# Patient Record
Sex: FEMALE
[De-identification: names, ages, dates, MRNs, and addresses within clinical notes are randomized per-mention and may not be internally consistent; named-entity substitution may affect disease eponyms.]

---

## 2017-03-15 NOTE — PATIENT DISCUSSION
**MILD TO MODERATE DRY EYE, OU: PRESCRIBED SYSTANE ARTIFICIAL TEARS 2-3 X A DAY OU. RECOMMENDS OMEGA-3 FISH OIL WITH PRIMARY CARE PHYSICIANS APPROVAL. RETURN FOR FOLLOW-UP AS SCHEDULED OR SOONER IF SYMPTOMS WORSEN.

## 2017-03-15 NOTE — PATIENT DISCUSSION
ALLERGIC CONJUNCTIVITIS OU: CONTINUE PAZEO TO USE QAM PRN FOR ITCHING AND IRRITATION. CALL IF NO IMPROVEMENT IN SYMPTOMS.

## 2017-03-15 NOTE — PATIENT DISCUSSION
Continue: PreserVision AREDS 2 (vit c,c-kh-mfvmu-lutein-zeaxan): capsule: 276-830-60-8 mg-unit-mg-mg 1 capsule once a day with meals by mouth

## 2017-03-15 NOTE — PATIENT DISCUSSION
Subconjunctival Hemorrhage Counseling:  I have explained that this is a temporary condition that will resolve spontaneously within a week or two. I have reassured the patient that this is a common occurrence often associated with dry eye, straining, systemic blood thinners or fluctuating blood pressure. I have explained to the patient that if these become recurrent or excessive, they should return to the office for a medical workup, assessment of risk factors, and possible appropriate laboratory studies, sometimes in collaboration with the patient's primary care physician.

## 2017-03-15 NOTE — PATIENT DISCUSSION
AMD (DRY), OU: VERY EARLY STAGE. PRESCRIBE AREDS 2 VITAMINS / AMSLER GRID DAILY / UV PROTECTION. SMOKING CESSATION EMPHASIZED. RETURN FOR FOLLOW-UP AS SCHEDULED.

## 2017-09-20 NOTE — PATIENT DISCUSSION
RETINA IS ATTACHED OU: PVD OU; NO HOLES OR TEARS SEEN ON DILATED EXAM TODAY.  RETINAL DETACHMENT SIGNS AND SYMPTOMS REVIEWED

## 2017-09-20 NOTE — PATIENT DISCUSSION
Continue: PreserVision AREDS 2 (vit c,o-me-hjxeb-lutein-zeaxan): capsule: 745-814-03-4 mg-unit-mg-mg 1 capsule once a day with meals by mouth

## 2018-07-18 NOTE — PATIENT DISCUSSION
Continue: PreserVision AREDS 2 (vit c,n-an-rmedj-lutein-zeaxan): capsule: 615-225-72-6 mg-unit-mg-mg 1 capsule once a day with meals by mouth

## 2018-07-18 NOTE — PATIENT DISCUSSION
MILD DRY EYE, OU: PRESCRIBED SYSTANE ARTIFICIAL TEARS PRN OU. RECOMMENDS OMEGA-3 FISH OIL WITH PRIMARY CARE PHYSICIANS APPROVAL. RETURN FOR FOLLOW-UP AS SCHEDULED OR SOONER IF SYMPTOMS WORSEN.

## 2018-09-19 NOTE — PATIENT DISCUSSION
Continue: PreserVision AREDS 2 (vit c,i-fx-spcid-lutein-zeaxan): capsule: 606-270-69-5 mg-unit-mg-mg 1 capsule once a day with meals by mouth

## 2018-12-19 ENCOUNTER — IMPORTED ENCOUNTER (OUTPATIENT)
Age: 63
End: 2018-12-19

## 2019-02-15 ENCOUNTER — IMPORTED ENCOUNTER (OUTPATIENT)
Age: 64
End: 2019-02-15

## 2019-07-11 NOTE — PATIENT DISCUSSION
AMD (DRY), OU:  PRESCRIBE AREDS 2 VITAMINS AND RECOMMEND UV PROTECTION. PATIENT IS AWARE OF AMSLER GRID AND HOW TO CHECK FOR PROGRESSION. SMOKING AVOIDANCE ADVISED.

## 2019-07-11 NOTE — PATIENT DISCUSSION
**MILD TO MODERATE DRY EYE, OU: PRESCRIBED REFRESH ADVANCED ARTIFICIAL TEARS 2-3 X A DAY OU. USE PAZEO AS NEEDED FOR ITCHING. RECOMMENDS OMEGA-3 FISH OIL WITH PRIMARY CARE PHYSICIANS APPROVAL. RETURN FOR FOLLOW-UP AS SCHEDULED OR SOONER IF SYMPTOMS WORSEN.

## 2019-07-11 NOTE — PATIENT DISCUSSION
Continue: PreserVision AREDS 2 (vit c,n-rj-bxrle-lutein-zeaxan): capsule: 213-813-15-5 mg-unit-mg-mg 1 capsule once a day with meals by mouth

## 2019-10-30 NOTE — PATIENT DISCUSSION
Continue: PreserVision AREDS 2 (vit c,a-fv-zttfr-lutein-zeaxan): capsule: 654-867-94-6 mg-unit-mg-mg 1 capsule once a day with meals by mouth

## 2020-07-13 NOTE — PATIENT DISCUSSION
Continue: Systane (peg 400-propylene glycol): drops: 0.4-0.3% 1 drop at bedtime as needed into both eyes

## 2020-07-13 NOTE — PATIENT DISCUSSION
Continue: PreserVision AREDS 2 (vit c,p-cw-euaqc-lutein-zeaxan): capsule: 976-839-58-1 mg-unit-mg-mg 1 capsule twice a day with meals by mouth

## 2021-02-10 NOTE — PATIENT DISCUSSION
Continue: PreserVision AREDS 2 (vit c,u-xf-ksiir-lutein-zeaxan): capsule: 958-881-78-2 mg-unit-mg-mg 1 capsule twice a day with meals by mouth

## 2021-06-15 NOTE — PATIENT DISCUSSION
Continue: PreserVision AREDS 2 (vit c,l-ol-czdyr-lutein-zeaxan): capsule: 475-613-15-7 mg-unit-mg-mg 1 capsule twice a day with meals by mouth

## 2021-06-15 NOTE — PATIENT DISCUSSION
DRY EYE SYNDROME OU: EDUCATED PATIENT ON FINDINGS AND ROLE IN FLUCTUATING VISION. CONTINUE ARTIFICIAL TEARS QID/PRN OU AND BEGIN WARM COMPRESSES QD-BID OU. DISCUSSED GOOD VISUAL HYGIENE AND CONSCIOUS BLINKING. ADVISED TO RTC IF SI/SX PERSIST OR WORSEN. MONITOR.

## 2021-06-15 NOTE — PATIENT DISCUSSION
SUBJECTIVE VISUAL DISTURBANCE/DRY ARMD OU: EDUCATED PATIENT ON FINDINGS. DISCUSSED THAT DISTORTION SEEN ON AMSLER GRID REFLECTS INTO OTHER NEAR ACTIVITIES. ARMD STABLE ON OCT AND UNDILATED EXAMINATION WITHOUT IRF/SRF OU. CONTINUE HOME AMSLER GRID MONITORING AND ADVISED TO RTC IF SI/SX PERSIST OR WORSEN. MONITOR.

## 2021-07-15 NOTE — PATIENT DISCUSSION
ARMD, OU: PT REPORTS METAMORPHOPSIA IN THE LAST SEVERAL WEEKS OS. SMALL INTRARETINAL HEME OS. RETURN TO DR. Meghana Wheeler IN THE NEXT 7-10 DAYS TO RULE OUT NEW ONSET EXUDATIVE AMD OS. REASSURED PATIENT THAT THERE IS NO REPORTED OCULAR SIDE EFFECTS RELATED TO THE COVID-19 PFISER VACCINE.

## 2021-07-15 NOTE — PATIENT DISCUSSION
Continue: PreserVision AREDS 2 (vit c,m-ap-fibrx-lutein-zeaxan): capsule: 408-986-85-1 mg-unit-mg-mg 1 capsule twice a day with meals by mouth

## 2021-07-15 NOTE — PATIENT DISCUSSION
Continue: Pataday Once Daily Relief (olopatadine): drops: 0.7% 1 drop every morning as needed into both eyes 07-

## 2021-07-21 NOTE — PATIENT DISCUSSION
Continue: PreserVision AREDS 2 (vit c,c-as-ljzcl-lutein-zeaxan): capsule: 955-380-47-5 mg-unit-mg-mg 1 capsule twice a day with meals by mouth

## 2021-08-18 NOTE — PATIENT DISCUSSION
Continue: PreserVision AREDS 2 (vit c,c-cl-dtjmc-lutein-zeaxan): capsule: 782-492-54-9 mg-unit-mg-mg 1 capsule twice a day with meals by mouth

## 2021-08-31 NOTE — PATIENT DISCUSSION
Continue: PreserVision AREDS 2 (vit c,l-cz-zlhvq-lutein-zeaxan): capsule: 317-321-57-2 mg-unit-mg-mg 1 capsule twice a day with meals by mouth

## 2021-09-15 NOTE — PATIENT DISCUSSION
Continue: PreserVision AREDS 2 (vit c,y-uq-yggku-lutein-zeaxan): capsule: 371-099-08-7 mg-unit-mg-mg 1 capsule twice a day with meals by mouth

## 2021-10-20 NOTE — PROCEDURE NOTE: CLINICAL
PROCEDURE NOTE: Avastin () OS. Diagnosis: Neovascular AMD with Active CNV. Anesthesia: Topical/Subconjunctival. Prep: Betadine Drops and Betadine Scrub. Betadine prep was performed. Product is within expiration date, and has been verified. An unopened 30 g needle was securely affixed to the 1 cc syringe. Excess drug and air bubbles were carefully expressed such that the plunger aligned with the .05 mL nadira on the syringe. A lid speculum was used. After the Betadine prep, intravitreal injection of Avastin 1.25mg/0.05 ml was given. Injection site: 3-4 mm from the limbus. The needle was withdrawn; retinal perfusion was verified. Lid speculum removed. The eye was irrigated with sterile irrigating solution. The betadine was washed away. Patient tolerated procedure well. Count fingers vision was verified. There were no complications. Patient given office phone number/answering service phone number. Post-injection instructions were reviewed and understood. Symptoms of RD and endophthalmitis following intravitreal injection were discussed. Patient advised to call right away if any loss of vision, new floaters, or eye pain. Post-op instructions given. Patient was given the standard instruction sheet. Appropriate follow-up was arranged. Kodi Elizondo

## 2021-11-12 ASSESSMENT — VISUAL ACUITY
OD_CC: 20/20 SN
OS_CC: 20/20 SN
OS_CC: 20/20 SN
OD_CC: 20/20 SN

## 2021-11-24 NOTE — PROCEDURE NOTE: CLINICAL
PROCEDURE NOTE: Avastin () OS. Diagnosis: Neovascular AMD with Active CNV. Anesthesia: Topical/Subconjunctival. Prep: Betadine Drops and Betadine Scrub. Betadine prep was performed. Product is within expiration date, and has been verified. An unopened 30 g needle was securely affixed to the 1 cc syringe. Excess drug and air bubbles were carefully expressed such that the plunger aligned with the .05 mL nadira on the syringe. A lid speculum was used. After the Betadine prep, intravitreal injection of Avastin 1.25mg/0.05 ml was given. Injection site: 3-4 mm from the limbus. The needle was withdrawn; retinal perfusion was verified. Lid speculum removed. The eye was irrigated with sterile irrigating solution. The betadine was washed away. Patient tolerated procedure well. Count fingers vision was verified. There were no complications. Patient given office phone number/answering service phone number. Post-injection instructions were reviewed and understood. Symptoms of RD and endophthalmitis following intravitreal injection were discussed. Patient advised to call right away if any loss of vision, new floaters, or eye pain. Post-op instructions given. Patient was given the standard instruction sheet. Appropriate follow-up was arranged. Elio Matos

## 2022-03-30 NOTE — PROCEDURE NOTE: CLINICAL
PROCEDURE NOTE: Avastin () OS. Diagnosis: Neovascular AMD with Active CNV. Anesthesia: Topical/Subconjunctival. Prep: Betadine Drops and Betadine Scrub. Betadine prep was performed. Product is within expiration date, and has been verified. An unopened 30 g needle was securely affixed to the 1 cc syringe. Excess drug and air bubbles were carefully expressed such that the plunger aligned with the .05 mL nadira on the syringe. A lid speculum was used. After the Betadine prep, intravitreal injection of Avastin 1.25mg/0.05 ml was given. Injection site: 3-4 mm from the limbus. The needle was withdrawn; retinal perfusion was verified. Lid speculum removed. The eye was irrigated with sterile irrigating solution. The betadine was washed away. Patient tolerated procedure well. Count fingers vision was verified. There were no complications. Patient given office phone number/answering service phone number. Post-injection instructions were reviewed and understood. Symptoms of RD and endophthalmitis following intravitreal injection were discussed. Patient advised to call right away if any loss of vision, new floaters, or eye pain. Post-op instructions given. Patient was given the standard instruction sheet. Appropriate follow-up was arranged. Mildred Arias

## 2022-04-14 NOTE — PATIENT DISCUSSION
Aqueous fluid sample of 0.2 mL submitted today to [facility] for bacterial and fungal cultures.

## 2022-04-14 NOTE — PATIENT DISCUSSION
Return as scheduled for IOP check, consider treatment with medication if IOP elevation is persistent.

## 2022-04-14 NOTE — PATIENT DISCUSSION
Discussed differential diagnosis including idiopathic noninfectious uveitis, sterile medication-associated uveitis, infectious endophthalmitis, or traumatic uveitis.  Discussed that exam did not show hypopyon, fibrin, vitritis or vitreous infiltrates, and/or severe eye pain usually associated with infectious endophthalmitis.

## 2022-04-14 NOTE — PROCEDURE NOTE: CLINICAL
PROCEDURE NOTE: A/C Paracentesis OS. Diagnosis: Ocular Hypertension. Anesthesia: Proparacaine 0.5%. Prior to treatment, the risks/benefits/alternatives were discussed. The patient wished to proceed with procedure. Location of Tap: 30g needle at [place] [id number]. The eye was prepped with topical Betadine 5%, a sterile lid speculum was placed in the eye. Volume of tap: 0.2 ml aqueous fluid. Patient tolerated procedure well. There were no complications. Post procedure instructions given. Patient given office phone number/answering service number and advised to call immediately should there be an increase in floaters or redness, loss of vision or pain, or should they have any other questions or concerns. Jhoana Bender

## 2022-06-17 NOTE — PATIENT DISCUSSION
Avastin 1.25MG (Office) 1.25 mg / 0.05 ml - OS: Surgeon: Jenn Baum no nausea/no vomiting/no diarrhea/no constipation/no abdominal pain